# Patient Record
Sex: FEMALE | Race: WHITE | NOT HISPANIC OR LATINO | Employment: STUDENT | URBAN - METROPOLITAN AREA
[De-identification: names, ages, dates, MRNs, and addresses within clinical notes are randomized per-mention and may not be internally consistent; named-entity substitution may affect disease eponyms.]

---
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
## 2024-02-19 ENCOUNTER — TELEPHONE (OUTPATIENT)
Dept: OBSTETRICS AND GYNECOLOGY | Facility: CLINIC | Age: 24
End: 2024-02-19
Payer: COMMERCIAL

## 2024-10-08 ENCOUNTER — HOSPITAL ENCOUNTER (OUTPATIENT)
Facility: HOSPITAL | Age: 24
Discharge: HOME | End: 2024-10-08
Attending: EMERGENCY MEDICINE
Payer: COMMERCIAL

## 2024-10-08 VITALS
TEMPERATURE: 97.8 F | DIASTOLIC BLOOD PRESSURE: 81 MMHG | HEART RATE: 77 BPM | SYSTOLIC BLOOD PRESSURE: 141 MMHG | BODY MASS INDEX: 25.71 KG/M2 | HEIGHT: 66 IN | WEIGHT: 160 LBS | OXYGEN SATURATION: 100 % | RESPIRATION RATE: 18 BRPM

## 2024-10-08 DIAGNOSIS — B34.9 VIRAL ILLNESS: ICD-10-CM

## 2024-10-08 DIAGNOSIS — Z20.822 EXPOSURE TO COVID-19 VIRUS: Primary | ICD-10-CM

## 2024-10-08 LAB
FLUAV SUBTYP SPEC NAA+PROBE: NOT DETECTED
FLUBV RNA ISLT QL NAA+PROBE: NOT DETECTED
SARS-COV-2 RNA RESP QL NAA+PROBE: NOT DETECTED

## 2024-10-08 PROCEDURE — 87636 SARSCOV2 & INF A&B AMP PRB: CPT | Performed by: EMERGENCY MEDICINE

## 2024-10-08 PROCEDURE — G0463 HOSPITAL OUTPT CLINIC VISIT: HCPCS | Performed by: PHYSICIAN ASSISTANT

## 2024-10-08 NOTE — Clinical Note
[facility]  [address]  [place] [address]    Fariha Kemp was seen and treated in our emergency department on 10/8/2024.  She may return to work on 10/14/2024.         Thank you for choosing [facility].    Zach Laguna III, PA

## 2024-10-08 NOTE — Clinical Note
[facility]  [address]  [facility] [address]    Fariha Kemp was seen and treated in our emergency department on 10/8/2024.  She may return to work on 10/11/2024.         Thank you for choosing [facility].    Jose Hall MD

## 2024-10-08 NOTE — DISCHARGE INSTRUCTIONS
Recommend quarantine for the next 5 days.  If fever free and feeling better okay to return to work.  If you are feeling much better in 3 days recommend repeating the COVID test.  If your home test is negative then this likely was not COVID-19 and as long as you are feeling better and fever free for 24 hours you can return to work early.    Return to the ER with any worsening symptoms or should you have any further concerns.

## 2024-10-12 NOTE — FSED PROVIDER NOTE
EMERGENCY DEPARTMENT ENCOUNTER    Room Number:  09/09  Date seen:  10/12/2024  Time seen: 15:25 EDT  PCP: Provider, No Known  Historian: Patient    Discussed/obtained information from independent historians: N/A    HPI:  Chief complaint: Cough, congestion, chills  A complete HPI/ROS/PMH/PSH/SH/FH are unobtainable due to: Nothing  Context:Fariha Kemp is a 24 y.o. female who presents to the ED with c/o cough, congestion, chills.  Symptoms ongoing for the past 3 to 4 days.  No night sweats.  Patient does report chills.  No nausea vomiting diarrhea.  No dysuria or hematuria.  No shortness of breath.  No past medical history of asthma.  Patient denies pregnancy.  She reports she had a positive home COVID test and would like to confirm.  She is here for further evaluation.        Chronic or social conditions impacting care:    ALLERGIES  Patient has no known allergies.    PAST MEDICAL HISTORY  Active Ambulatory Problems     Diagnosis Date Noted    No Active Ambulatory Problems     Resolved Ambulatory Problems     Diagnosis Date Noted    No Resolved Ambulatory Problems     No Additional Past Medical History       PAST SURGICAL HISTORY  History reviewed. No pertinent surgical history.    FAMILY HISTORY  History reviewed. No pertinent family history.    SOCIAL HISTORY  Social History     Socioeconomic History    Marital status: Single   Tobacco Use    Smoking status: Never   Substance and Sexual Activity    Alcohol use: Not Currently    Drug use: Never    Sexual activity: Yes     Partners: Female, Male       REVIEW OF SYSTEMS  Review of Systems    All systems reviewed and negative except for those discussed in HPI.     PHYSICAL EXAM    I have reviewed the triage vital signs and nursing notes.  Vitals:    10/08/24 1705   BP: 141/81   Pulse: 77   Resp: 18   Temp: 97.8 °F (36.6 °C)   SpO2: 100%     Physical Exam    GENERAL: WDWN female, nontoxic  HENT: nares patent  EYES: no scleral icterus  NECK: no ROM  limitations  CV: regular rhythm, regular rate  RESPIRATORY: normal effort, lungs CTAB  ABDOMEN: soft, nontender  : deferred  MUSCULOSKELETAL: no deformity  NEURO: alert, moves all extremities, follows commands  SKIN: warm, dry    LAB RESULTS  No results found for this or any previous visit (from the past 24 hours).    Ordered the above labs and independently interpreted results.  My findings will be discussed in the ED course or medical decision making section below    RADIOLOGY RESULTS  No Radiology Exams Resulted Within Past 24 Hours     Ordered the above noted radiological studies.  Independently interpreted by me.  My findings will be discussed in the medical decision section below.     PROGRESS, DATA ANALYSIS, CONSULTS AND MEDICAL DECISION MAKING    Please note that this section constitutes my independent interpretation of clinical data including lab results, radiology, EKG's.  This constitutes my independent professional opinion regarding differential diagnosis and management of this patient.  It may include any factors such as history from outside sources, review of external records, social determinants of health, management of medications, response to those treatments, and discussions with other providers.    ED Course as of 10/12/24 1534   Tue Oct 08, 2024   1748 COVID19: Not Detected [RC]   1748 Influenza A PCR: Not Detected [RC]   1748 Influenza B PCR: Not Detected [RC]   1758 Patient's COVID test negative here but was positive at home.  Patient recently exposed to COVID.  Will go ahead and err on the side of caution and write the patient a note quarantine for 5 days.  I will have her to recheck in 3 days and if positive again to complete the quarantine.  If negative and fever free she will probably be okay to return to work [RC]      ED Course User Index  [RC] Zach Laguna III, PA     Orders placed during this visit:  Orders Placed This Encounter   Procedures    COVID-19 and FLU A/B PCR, 1  HR TAT - Swab, Nasopharynx            Medical Decision Making    COVID, flu, strep.  Doubt pneumonia as patient's lungs are clear to auscultation bilaterally.  She is well-appearing.  She is requesting a COVID test to confirm.  Open and flu test ordered by triage and negative.  See ED course for MDM process  DIAGNOSIS  Final diagnoses:   Exposure to COVID-19 virus   Viral illness          Medication List      No changes were made to your prescriptions during this visit.         FOLLOW-UP  No follow-up provider specified.      Latest Documented Vital Signs:  As of 15:34 EDT  BP- 141/81 HR- 77 Temp- 97.8 °F (36.6 °C) O2 sat- 100%    Appropriate PPE utilized throughout this patient encounter to include mask, if indicated, per current protocol. Hand hygiene was performed before donning PPE and after removal when leaving the room.    Please note that portions of this were completed with a voice recognition program.     Note Disclaimer: At [facility], we believe that sharing information builds trust and better relationships. You are receiving this note because you are receiving care at [facility] or recently visited. It is possible you will see health information before a provider has talked with you about it. This kind of information can be easy to misunderstand. To help you fully understand what it means for your health, we urge you to discuss this note with your provider.